# Patient Record
Sex: FEMALE | Race: WHITE | NOT HISPANIC OR LATINO | URBAN - METROPOLITAN AREA
[De-identification: names, ages, dates, MRNs, and addresses within clinical notes are randomized per-mention and may not be internally consistent; named-entity substitution may affect disease eponyms.]

---

## 2018-06-29 NOTE — PATIENT DISCUSSION
Recommended artificial tears to use: 1 drop 4x a day in both eyes as needed, Refresh Optive recommended brand.

## 2019-05-06 NOTE — PATIENT DISCUSSION
Explained that vitreous floaters will be unaffected by treatment of retinal tear or retinal detachment.

## 2019-05-06 NOTE — PATIENT DISCUSSION
The patient's findings are compatible with epiretinal membrane with macular pucker. Macular pucker is usually due to previous posterior vitreous detachment but can also be due to uveitis, retinal vascular occlusion, retinal tear, retinal detachment, and idiopathic. Most patients with epiretinal membranes with macular pucker do not progress to the point where intervention is needed. Intervention is typically surgical. The patient can be observed carefully with retinal follow-up in a number of months. If the maculopathy progresses, surgery will be considered. Rec observation.

## 2019-05-06 NOTE — PROCEDURE NOTE: CLINICAL
PROCEDURE NOTE: Laser for Retinal Tear #1 OS. Diagnosis: Horseshoe Tear. Prior to laser, risks/benefits/alternatives to laser discussed including loss of vision, decreased peripheral and night vision, need for more laser and/or surgery and patient wished to proceed. A written consent is on file, and the need for today’s laser was discussed and the patient is understanding and wishes to proceed. Laser Lens: *. Wavelength: Argon Green. Spot size: * um. Pulse power: * mW. Number of pulses: *. Patient tolerated procedure well. There were no complications. Post-op instructions given. Patient given office phone number/answering service number and advised to call immediately should there be loss of vision or pain, or should they have any other questions or concerns. Sangita Parsons

## 2019-05-20 NOTE — PATIENT DISCUSSION
Old blood and debris. Should settle with time can take weeks to months. Advised to call with any worsening of symptoms.

## 2019-08-26 NOTE — PATIENT DISCUSSION
Stained vitreous from old vitreous hem. Only treatment is with surgery, Usually like to wait 6 months from time of hemorrhage. Blood can be slow to be reabsorb. Will follow up in 2 months and if still bothersome can consider surgery. Advised pt to call with any vision changes.

## 2020-06-15 NOTE — PATIENT DISCUSSION
"Could be factor in ""fuzzy"" vision. Recommend pt follow with Dr. Jhonny Jett in 3-4 months to re-evaluate PCO and possible Rx glasses. "

## 2020-07-02 NOTE — PATIENT DISCUSSION
"Could be factor in ""fuzzy"" vision. Recommend pt follow with Dr. Ta Peck in 3-4 months to re-evaluate PCO and possible Rx glasses. "

## 2020-07-02 NOTE — PATIENT DISCUSSION
Recommend Bilateral lower lid blepharoplasty trans conj laser (discussed risks and benefits of sx. ..) with canthoplasty going through ins.

## 2020-07-02 NOTE — PATIENT DISCUSSION
Recommend Both lower lid ectropion repair with canthoplasty go through ins if pt does BLL Bleph TCL.

## 2020-07-02 NOTE — PATIENT DISCUSSION
This visual field clearly demonstrated a minimum of 47% loss of upper field of vision OU, with upper lid skin in repose and elevated by taping of the lid to demonstrate potential correction. This field shows that taping the lids significantly improved this patient's superior field of vision by approximately 38%, OU.

## 2020-07-16 NOTE — PATIENT DISCUSSION
Patient informed of available non-opioid medications and other non-pharmacological options. Discussed advantages and disadvantages of the alternatives, including whether the patient is at-risk for, or has a history of, controlled substance abuse or misuse, and the patient’s personal preferences. [facility] “Opioid Alternative Pamphlet” was provided to patient.

## 2020-07-16 NOTE — PATIENT DISCUSSION
"Could be factor in ""fuzzy"" vision. Recommend pt follow with Dr. Samantha Helton in 3-4 months to re-evaluate PCO and possible Rx glasses. "

## 2020-08-04 NOTE — PATIENT DISCUSSION
Patient informed of available non-opioid medications and other non-pharmacological options. Discussed advantages and disadvantages of the alternatives, including whether the patient is at-risk for, or has a history of, controlled substance abuse or misuse, and the patient’s personal preferences. [facility] “Opioid Alternative Pamphlet” was provided to patient.

## 2020-08-04 NOTE — PATIENT DISCUSSION
"Could be factor in ""fuzzy"" vision. Recommend pt follow with Dr. Deepthi Linares in 3-4 months to re-evaluate PCO and possible Rx glasses. "

## 2020-08-05 NOTE — PATIENT DISCUSSION
"Could be factor in ""fuzzy"" vision. Recommend pt follow with Dr. Virginia Hook in 3-4 months to re-evaluate PCO and possible Rx glasses. "

## 2020-08-13 NOTE — PATIENT DISCUSSION
"Could be factor in ""fuzzy"" vision. Recommend pt follow with Dr. Pooja Arizmendi in 3-4 months to re-evaluate PCO and possible Rx glasses. "

## 2020-08-27 NOTE — PATIENT DISCUSSION
"Could be factor in ""fuzzy"" vision. Recommend pt follow with Dr. Vasquez Kingsley in 3-4 months to re-evaluate PCO and possible Rx glasses. "

## 2021-06-14 NOTE — PATIENT DISCUSSION
I explained that the floaters will slowly fade over a period of months to years but will not likely disappear completely. They are usually well-tolerated. Intermittent flashes will fade over time as well and do not add worrisome significance.  Pt does state they are affecting ADL's.

## 2021-06-14 NOTE — PATIENT DISCUSSION
Discussed vitrectomy surgery to remove vitreous opacities after discussing risks/benefits/alternatives.  Pt states OS opacities are affecting ADL's, rec VF 24-2 1st to eval and if wnl then schedule 27G PPV.

## 2021-06-28 NOTE — PATIENT DISCUSSION
Discussed vitrectomy surgery to remove vitreous opacities after discussing risks/benefits/alternatives pt elects to proceed.  Rec 25G PPV, possible laser(due to prior RT).   Sx discussed in detail.  Pt needs medical clearance with Cardiologist Dr. Boo Guzman for 3 days.

## 2021-06-28 NOTE — PATIENT DISCUSSION
I explained that the floaters will slowly fade over a period of months to years but will not likely disappear completely. They are usually well-tolerated. Intermittent flashes will fade over time as well and do not add worrisome significance.  Pt does state they are affecting ADL's and will proceed with PPV for vit opac.

## 2021-07-01 NOTE — PATIENT DISCUSSION
Discussed vitrectomy surgery to remove vitreous opacities after discussing risks/benefits/alternatives pt elects to proceed.  Rec 25G PPV, possible laser(due to prior RT).   Sx discussed in detail.  Pt needs medical clearance with Cardiologist Dr. Waqar Dickey for 3 days.

## 2021-07-02 ENCOUNTER — PREPPED CHART (OUTPATIENT)
Dept: URBAN - METROPOLITAN AREA CLINIC 20 | Facility: CLINIC | Age: 71
End: 2021-07-02

## 2021-07-14 NOTE — PATIENT DISCUSSION
GTTS: Continue Ofloxacin QID until Wed then stop. Continue with Pred QID until Wed then decrease to TID until next appt.

## 2021-08-02 NOTE — PATIENT DISCUSSION
Doing well, retina attached, good IOP with no signs of endophthalmitis. Pt. Adequately sedated.  Final time-out done and agreed with all staff.  See ANESTHESIA  For all medications and vital signs.

## 2021-08-02 NOTE — PATIENT DISCUSSION
Advised regular use of Amsler grid. · Continue melatonin hs  · Added back zyprexa at 2 5mg qhs yesterday, consider increasing back to 5mg qhs  · Continue to monitor

## 2021-10-18 NOTE — PATIENT DISCUSSION
Recommended observation. Thank you for coming to Urgent Care today. It was a pleasure caring for you!  JULIA Beatty     Take tylenol/ibuprofen as needed for fever, body aches, headaches  Rest and push fluids, if feeling dehydrated, add electrolyte solution  Try to maintain social distancing at home  Supportive treatment as needed (cough medication, allergy medication, saltwater gargles, cold thick foods and fluids, etc)  Follow up with your doctor for any new or worsening concerns.        Patient Education     Viral Upper Respiratory Illness (Adult)    You have a viral upper respiratory illness (URI), which is another term for the common cold. This illness is contagious during the first few days. It is spread through the air by coughing and sneezing. It may also be spread by direct contact (touching the sick person and then touching your own eyes, nose, or mouth). Frequent handwashing will decrease risk of spread. Most viral illnesses go away within 7 to 10 days with rest and simple home remedies. Sometimes the illness may last for several weeks. Antibiotics will not kill a virus, and they are generally not prescribed for this condition.  Home care  · If symptoms are severe, rest at home for the first 2 to 3 days. When you resume activity, don't let yourself get too tired.  · Don't smoke. If you need help stopping, talk with your healthcare provider.  · Avoid being exposed to cigarette smoke (yours or others’).  · You may use acetaminophen or ibuprofen to control pain and fever, unless another medicine was prescribed. If you have chronic liver or kidney disease, have ever had a stomach ulcer or gastrointestinal bleeding, or are taking blood-thinning medicines, talk with your healthcare provider before using these medicines. Aspirin should never be given to anyone under 18 years of age who is ill with a viral infection or fever. It may cause severe liver or brain damage.  · Your appetite may be poor, so a light diet is fine. Stay  well hydrated by drinking 6 to 8 glasses of fluids per day (water, soft drinks, juices, tea, or soup). Extra fluids will help loosen secretions in the nose and lungs.  · Over-the-counter cold medicines will not shorten the length of time you’re sick, but they may be helpful for the following symptoms: cough, sore throat, and nasal and sinus congestion. If you take prescription medicines, ask your healthcare provider or pharmacist which over-the-counter medicines are safe to use. (Note: Don't use decongestants if you have high blood pressure.)  Follow-up care  Follow up with your healthcare provider, or as advised.  When to seek medical advice  Call your healthcare provider right away if any of these occur:  · Cough with lots of colored sputum (mucus)  · Severe headache; face, neck, or ear pain  · Difficulty swallowing due to throat pain  · Fever of 100.4°F (38°C) or higher, or as directed by your healthcare provider  Call 911  Call 911 if any of these occur:  · Chest pain, shortness of breath, wheezing, or difficulty breathing  · Coughing up blood  · Very severe pain with swallowing, especially if it goes along with a muffled voice   [employer] last reviewed this educational content on 6/1/2018  © 0772-5256 [employer]. All rights reserved. This information is not intended as a substitute for professional medical care. Always follow your healthcare professional's instructions.

## 2022-03-24 ASSESSMENT — TONOMETRY
OD_IOP_MMHG: 15
OS_IOP_MMHG: 13

## 2022-03-24 ASSESSMENT — VISUAL ACUITY
OS_SC: 20/20
OD_SC: 20/30

## 2022-03-28 ENCOUNTER — ESTABLISHED PATIENT (OUTPATIENT)
Dept: URBAN - METROPOLITAN AREA CLINIC 20 | Facility: CLINIC | Age: 72
End: 2022-03-28

## 2022-03-28 DIAGNOSIS — H35.363: ICD-10-CM

## 2022-03-28 PROCEDURE — 92134 CPTRZ OPH DX IMG PST SGM RTA: CPT

## 2022-03-28 PROCEDURE — 92014 COMPRE OPH EXAM EST PT 1/>: CPT

## 2022-03-28 ASSESSMENT — TONOMETRY
OS_IOP_MMHG: 14
OD_IOP_MMHG: 14

## 2022-03-28 ASSESSMENT — KERATOMETRY
OS_AXISANGLE2_DEGREES: 90
OS_K1POWER_DIOPTERS: 45.25
OD_K2POWER_DIOPTERS: 45.00
OD_AXISANGLE2_DEGREES: 62
OD_AXISANGLE_DEGREES: 152
OS_AXISANGLE_DEGREES: 0
OD_K1POWER_DIOPTERS: 44.75
OS_K2POWER_DIOPTERS: 45.25

## 2022-03-28 ASSESSMENT — VISUAL ACUITY
OD_SC: 20/30-1
OS_SC: 20/30+2

## 2022-07-07 NOTE — PATIENT DISCUSSION
Continue to use tylenol or ibuprofen as needed for fevers or pain.   Lots of fluids to keep hydrated.   Please return to the ER for difficulty breathing, inability to tolerate fluids, lethargy or if any other concerning symptoms develop.    Recommended artificial tears to use: 1 drop 4x a day in both eyes as needed, Refresh Optive recommended brand.

## 2022-07-14 ENCOUNTER — ESTABLISHED PATIENT (OUTPATIENT)
Dept: URBAN - METROPOLITAN AREA CLINIC 20 | Facility: CLINIC | Age: 72
End: 2022-07-14

## 2022-07-14 DIAGNOSIS — B02.39: ICD-10-CM

## 2022-07-14 DIAGNOSIS — H35.363: ICD-10-CM

## 2022-07-14 PROCEDURE — 92012 INTRM OPH EXAM EST PATIENT: CPT

## 2022-07-14 ASSESSMENT — TONOMETRY
OD_IOP_MMHG: 13
OS_IOP_MMHG: 13

## 2022-07-14 ASSESSMENT — VISUAL ACUITY
OD_SC: 20/40-3
OS_SC: 20/25

## 2022-11-10 NOTE — PATIENT DISCUSSION
CALLED PT TO GO OVER OB CONTRACT, PT COMPLETELY UNDERSTAND WILL CB WITH QUESTIONS, SIGN AT VISIT 11/30   Recommended artificial tears to use: 1 drop 4x a day in both eyes as needed, Refresh Optive recommended brand.

## 2022-12-01 NOTE — PATIENT DISCUSSION
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 Long session with trial lenses including base down OD and base in OS did not relieve discomfort. Pt noted extreme improvement witjh manifest refraction and additional prism did not improve. Will rx MR and educate that we may still need to try Delroy in future.

## 2022-12-22 NOTE — PATIENT DISCUSSION
12/22/22- diplopia better with rx but not perfect. Long trial with prism gave no consistent result. Pt feels it is tolerable. RTC if worsens.

## 2023-04-03 ENCOUNTER — COMPREHENSIVE EXAM (OUTPATIENT)
Dept: URBAN - METROPOLITAN AREA CLINIC 20 | Facility: CLINIC | Age: 73
End: 2023-04-03

## 2023-04-03 DIAGNOSIS — H35.363: ICD-10-CM

## 2023-04-03 PROCEDURE — 92014 COMPRE OPH EXAM EST PT 1/>: CPT

## 2023-04-03 PROCEDURE — 92134 CPTRZ OPH DX IMG PST SGM RTA: CPT

## 2023-04-03 ASSESSMENT — VISUAL ACUITY
OD_PH: 20/20-3
OD_SC: 20/40+1
OS_SC: 20/20-1
OU_SC: 20/25

## 2023-04-03 ASSESSMENT — TONOMETRY
OD_IOP_MMHG: 12
OS_IOP_MMHG: 14

## 2023-10-09 NOTE — PATIENT DISCUSSION
"Could be factor in ""fuzzy"" vision. Recommend pt follow with Dr. Samantha Helton in 3-4 months to re-evaluate PCO and possible Rx glasses. "
Advised regular use of Amsler grid.
Advised to call immediately if any worsening distortion or blurring is noted.
All questions answered and patient understands.
Discussed risks/benefits and alternatives to surgery.
Educated patient how to monitor their peripheral vision and report any changes.
Expect that vitreous opacities will resolve with time.
I explained that the floaters will slowly fade over a period of months to years but will not likely disappear completely. They are usually well-tolerated. Intermittent flashes will fade over time as well and do not add worrisome significance.
Membrane is not visually significant.
Monitor.
No new retinal tears seen on exam.
No retinal detachment or retinal tear noted.
Patient advised to call if any problems, questions, or concerns.
Patient educated on effects of dry eye and blurred vision. Other cause may be Red Tide.
Patient elects observation.
Patient informed of available non-opioid medications and other non-pharmacological options. Discussed advantages and disadvantages of the alternatives, including whether the patient is at-risk for, or has a history of, controlled substance abuse or misuse, and the patient’s personal preferences. [facility] “Opioid Alternative Pamphlet” was provided to patient.
Recommend Bilateral lower lid blepharoplasty trans conj laser (discussed risks and benefits of sx. ..) with canthoplasty going through ins.
Recommend Bilateral upper lid blepharoplasty. Medicare (discussed risks and benefits of sx. .. ).
Recommend Both lower lid ectropion repair with canthoplasty go through ins if pt does BLL Bleph TCL.
Recommend next available consult with Dr. Zack Wright.
Recommended against surgery at this time given that patient is happy with present vision.
Recommended artificial tears to use: 1 drop 4x a day in both eyes as needed, Refresh Optive recommended brand.
Recommended observation.
Resolved.
Retinal detachment warnings given.
Retinal tear and detachment warning symptoms reviewed and patient instructed to call immediately if increasing floaters, flashes, or decreasing peripheral vision.
Reviewed pathophysiology of PVD and the occasional association with tears and retinal detachments.
See #1.
This visual field clearly demonstrated a minimum of 47% loss of upper field of vision OU, with upper lid skin in repose and elevated by taping of the lid to demonstrate potential correction. This field shows that taping the lids significantly improved this patient's superior field of vision by approximately 38%, OU.
Vitreous opacities are not visually significant.
Well lasered, retina attached.
will carry laser lower to take off lesions/age spots with #2 BLL Bleph TCL.
show

## 2023-12-01 NOTE — PATIENT DISCUSSION
Patient's wife called to let us know that Dr. Tirado asked about changing dosage via [employer] and patient wants to let him know that it's okay to change this and send refill for this:    methIMAzole (TAPAZOLE) 10 MG tablet       Also need refill for     atenolol (TENORMIN) 50 MG tablet   (This dosage stays same)       Vitreous opacities are not visually significant.

## 2024-04-08 ENCOUNTER — ESTABLISHED PATIENT (OUTPATIENT)
Dept: URBAN - METROPOLITAN AREA CLINIC 20 | Facility: CLINIC | Age: 74
End: 2024-04-08

## 2024-04-08 DIAGNOSIS — H35.363: ICD-10-CM

## 2024-04-08 PROCEDURE — 99214 OFFICE O/P EST MOD 30 MIN: CPT

## 2024-04-08 PROCEDURE — 92134 CPTRZ OPH DX IMG PST SGM RTA: CPT

## 2024-04-08 ASSESSMENT — KERATOMETRY
OS_AXISANGLE2_DEGREES: 136
OD_K2POWER_DIOPTERS: 45.00
OS_AXISANGLE_DEGREES: 46
OD_AXISANGLE_DEGREES: 0
OD_AXISANGLE2_DEGREES: 90
OD_K1POWER_DIOPTERS: 45.00
OS_K2POWER_DIOPTERS: 45.25
OS_K1POWER_DIOPTERS: 44.75

## 2024-04-08 ASSESSMENT — VISUAL ACUITY
OS_SC: 20/30-2
OS_SC: J2
OU_SC: 20/30-3
OD_SC: 20/25-1
OD_SC: J1
OU_SC: J3

## 2024-04-08 ASSESSMENT — TONOMETRY
OD_IOP_MMHG: 13
OS_IOP_MMHG: 13

## 2025-04-14 ENCOUNTER — FOLLOW UP (OUTPATIENT)
Age: 75
End: 2025-04-14

## 2025-04-14 DIAGNOSIS — H35.363: ICD-10-CM

## 2025-04-14 DIAGNOSIS — H57.813: ICD-10-CM

## 2025-04-14 PROCEDURE — 99214 OFFICE O/P EST MOD 30 MIN: CPT

## 2025-05-12 ENCOUNTER — CLINIC PROCEDURE ONLY (OUTPATIENT)
Age: 75
End: 2025-05-12

## 2025-05-12 DIAGNOSIS — H35.363: ICD-10-CM

## 2025-05-12 DIAGNOSIS — H26.492: ICD-10-CM

## 2025-05-12 PROCEDURE — 99214 OFFICE O/P EST MOD 30 MIN: CPT | Mod: 57

## 2025-05-12 PROCEDURE — 66821 AFTER CATARACT LASER SURGERY: CPT

## 2025-05-29 ENCOUNTER — CLINIC PROCEDURE ONLY (OUTPATIENT)
Age: 75
End: 2025-05-29

## 2025-05-29 DIAGNOSIS — Z98.890: ICD-10-CM

## 2025-05-29 DIAGNOSIS — H26.491: ICD-10-CM

## 2025-05-29 PROCEDURE — 99214 OFFICE O/P EST MOD 30 MIN: CPT

## 2025-05-29 PROCEDURE — 66821 AFTER CATARACT LASER SURGERY: CPT
